# Patient Record
Sex: FEMALE | Race: WHITE
[De-identification: names, ages, dates, MRNs, and addresses within clinical notes are randomized per-mention and may not be internally consistent; named-entity substitution may affect disease eponyms.]

---

## 2017-07-20 ENCOUNTER — HOSPITAL ENCOUNTER (OUTPATIENT)
Dept: HOSPITAL 10 - GIL | Age: 57
Discharge: HOME | End: 2017-07-20
Attending: INTERNAL MEDICINE
Payer: COMMERCIAL

## 2017-07-20 VITALS — BODY MASS INDEX: 31.56 KG/M2 | HEIGHT: 62 IN | WEIGHT: 171.52 LBS

## 2017-07-20 VITALS — RESPIRATION RATE: 14 BRPM | HEART RATE: 70 BPM | DIASTOLIC BLOOD PRESSURE: 62 MMHG | SYSTOLIC BLOOD PRESSURE: 109 MMHG

## 2017-07-20 VITALS — RESPIRATION RATE: 18 BRPM | HEART RATE: 87 BPM | DIASTOLIC BLOOD PRESSURE: 71 MMHG | SYSTOLIC BLOOD PRESSURE: 135 MMHG

## 2017-07-20 DIAGNOSIS — I10: ICD-10-CM

## 2017-07-20 DIAGNOSIS — K92.1: Primary | ICD-10-CM

## 2017-07-20 DIAGNOSIS — K21.9: ICD-10-CM

## 2017-07-20 DIAGNOSIS — K29.60: ICD-10-CM

## 2017-07-20 DIAGNOSIS — K64.8: ICD-10-CM

## 2017-07-20 DIAGNOSIS — K44.9: ICD-10-CM

## 2017-07-20 DIAGNOSIS — E11.9: ICD-10-CM

## 2017-07-20 DIAGNOSIS — E78.5: ICD-10-CM

## 2017-07-20 PROCEDURE — 43239 EGD BIOPSY SINGLE/MULTIPLE: CPT

## 2017-07-20 PROCEDURE — 87081 CULTURE SCREEN ONLY: CPT

## 2017-07-20 PROCEDURE — 45378 DIAGNOSTIC COLONOSCOPY: CPT

## 2017-07-20 PROCEDURE — 82962 GLUCOSE BLOOD TEST: CPT

## 2019-01-25 ENCOUNTER — HOSPITAL ENCOUNTER (OUTPATIENT)
Dept: HOSPITAL 91 - SDS | Age: 59
Discharge: HOME | End: 2019-01-25
Payer: COMMERCIAL

## 2019-01-25 ENCOUNTER — HOSPITAL ENCOUNTER (OUTPATIENT)
Dept: HOSPITAL 10 - SDS | Age: 59
Discharge: HOME | End: 2019-01-25
Attending: SURGERY
Payer: COMMERCIAL

## 2019-01-25 VITALS
BODY MASS INDEX: 33.26 KG/M2 | WEIGHT: 176.15 LBS | HEIGHT: 61 IN | BODY MASS INDEX: 33.26 KG/M2 | HEIGHT: 61 IN | WEIGHT: 176.15 LBS

## 2019-01-25 VITALS — SYSTOLIC BLOOD PRESSURE: 111 MMHG | DIASTOLIC BLOOD PRESSURE: 67 MMHG | HEART RATE: 90 BPM | RESPIRATION RATE: 17 BRPM

## 2019-01-25 VITALS — RESPIRATION RATE: 16 BRPM | HEART RATE: 68 BPM | SYSTOLIC BLOOD PRESSURE: 120 MMHG | DIASTOLIC BLOOD PRESSURE: 68 MMHG

## 2019-01-25 VITALS — RESPIRATION RATE: 19 BRPM | HEART RATE: 90 BPM | SYSTOLIC BLOOD PRESSURE: 112 MMHG | DIASTOLIC BLOOD PRESSURE: 64 MMHG

## 2019-01-25 VITALS — HEART RATE: 100 BPM | SYSTOLIC BLOOD PRESSURE: 122 MMHG | RESPIRATION RATE: 14 BRPM | DIASTOLIC BLOOD PRESSURE: 65 MMHG

## 2019-01-25 VITALS — HEART RATE: 92 BPM | SYSTOLIC BLOOD PRESSURE: 119 MMHG | DIASTOLIC BLOOD PRESSURE: 69 MMHG | RESPIRATION RATE: 20 BRPM

## 2019-01-25 VITALS — RESPIRATION RATE: 20 BRPM | HEART RATE: 94 BPM | DIASTOLIC BLOOD PRESSURE: 69 MMHG | SYSTOLIC BLOOD PRESSURE: 120 MMHG

## 2019-01-25 VITALS — RESPIRATION RATE: 16 BRPM | SYSTOLIC BLOOD PRESSURE: 126 MMHG | DIASTOLIC BLOOD PRESSURE: 75 MMHG | HEART RATE: 95 BPM

## 2019-01-25 VITALS — HEART RATE: 112 BPM | SYSTOLIC BLOOD PRESSURE: 132 MMHG | DIASTOLIC BLOOD PRESSURE: 72 MMHG | RESPIRATION RATE: 20 BRPM

## 2019-01-25 VITALS — DIASTOLIC BLOOD PRESSURE: 71 MMHG | SYSTOLIC BLOOD PRESSURE: 116 MMHG | HEART RATE: 102 BPM | RESPIRATION RATE: 14 BRPM

## 2019-01-25 VITALS — DIASTOLIC BLOOD PRESSURE: 68 MMHG | SYSTOLIC BLOOD PRESSURE: 127 MMHG | RESPIRATION RATE: 16 BRPM | HEART RATE: 96 BPM

## 2019-01-25 VITALS — SYSTOLIC BLOOD PRESSURE: 110 MMHG | RESPIRATION RATE: 16 BRPM | DIASTOLIC BLOOD PRESSURE: 65 MMHG | HEART RATE: 65 BPM

## 2019-01-25 VITALS — DIASTOLIC BLOOD PRESSURE: 65 MMHG | HEART RATE: 92 BPM | RESPIRATION RATE: 20 BRPM | SYSTOLIC BLOOD PRESSURE: 110 MMHG

## 2019-01-25 VITALS — DIASTOLIC BLOOD PRESSURE: 74 MMHG | SYSTOLIC BLOOD PRESSURE: 114 MMHG | RESPIRATION RATE: 21 BRPM | HEART RATE: 83 BPM

## 2019-01-25 DIAGNOSIS — E11.9: ICD-10-CM

## 2019-01-25 DIAGNOSIS — I10: ICD-10-CM

## 2019-01-25 DIAGNOSIS — D17.21: Primary | ICD-10-CM

## 2019-01-25 PROCEDURE — 25071 EXC FOREARM LES SC 3 CM/>: CPT

## 2019-01-25 PROCEDURE — 82962 GLUCOSE BLOOD TEST: CPT

## 2019-01-25 PROCEDURE — 88304 TISSUE EXAM BY PATHOLOGIST: CPT

## 2019-01-25 PROCEDURE — 88305 TISSUE EXAM BY PATHOLOGIST: CPT

## 2019-01-25 RX ADMIN — LIDOCAINE HYDROCHLORIDE 1 ML: 10 INJECTION, SOLUTION EPIDURAL; INFILTRATION; INTRACAUDAL; PERINEURAL at 14:46

## 2019-01-25 RX ADMIN — CEFAZOLIN SODIUM 1 MLS/HR: 2 SOLUTION INTRAVENOUS at 14:08

## 2019-01-25 RX ADMIN — BUPIVACAINE HYDROCHLORIDE AND EPINEPHRINE BITARTRATE 1 ML: 5; .005 INJECTION, SOLUTION EPIDURAL; INTRACAUDAL; PERINEURAL at 14:45

## 2019-01-25 NOTE — OPR
Date/Time of Note


Date/Time of Note


DATE: 1/25/19 


TIME: 14:50





Operative Report


Procedure Date:  Jan 25, 2019


Preoperative Diagnosis


Right forearm lipoma


Postoperative Diagnosis


Right forearm lipoma


Operation/Procedure Performed


Excision of right forearm lipoma, 6 cm


Removal of excess skin 15 cm x 1 cm


Surgeon


see signature line


Assistant


None


Anesthesia Type:  general


Estimated Blood Loss:  0 - 10 ml's


Transfusion


   none


Specimen


Right forearm lipoma


Grafts/Implants


none


Complications


none


Pt Condition Post Procedure:  stable


Disposition:  PACU


Indications


This patient presented with a large lump along her right forearm that was 


causing pain and discomfort.  It was mobile and consistent with a very large 


lipoma for which she will undergo excision.  Given the excess skin in the area 


of the lipoma which has been stretched significantly she will also undergo 


removal of excess skin simultaneously to prevent formation of a seroma and 


infection.


Procedure Description


The right upper extremity was prepped and draped in Betadine solution.  Timeout 


was conducted.  20 mL of a mixture of quarter percent Marcaine and 1% lidocaine 


was infiltrated into the dermis.  The lipoma was very large and measured about 


10 cm from the outside.  In order to excise the lipoma and accompanying excess 


skin in elliptical incision measuring 15 cm longitudinally and 5 cm transversely


was created.  The skin along with subcutaneous tissue was cauterized down to the


capsule of the lipoma.  The lipoma was thereafter enucleated and removed with 


the capsule intact and on the table it was measured to be 6 cm in diameter.  


Afterwards the accompanying subcutaneous tissue and skin that was next the 


lipoma was removed so that the skin of the forearm could be reapproximated 


without any tension or disfigurement.  The subcutaneous tissue was 


reapproximated with interrupted 3-0 Vicryl sutures.  Irrigation was carried out 


with saline.  The skin edges were then reapproximated with interrupted 3-0 nylon


sutures.  Wet dry dressings were applied and then covered with Dermabond.  


Patient tolerated procedure well.  All instrument sponge and needle counts were 


correct at the end of the procedure.











ANABELLA KERR                   Jan 25, 2019 14:53

## 2019-01-25 NOTE — PREAC
Date/Time of Note


Date/Time of Note


DATE: 1/25/19 


TIME: 14:08





Anesthesia Eval and Record


Evaluation


Time Pre-Procedure Interview


DATE: 1/25/19 


TIME: 14:08


Age


58


Sex


female


NPO:  8 hrs


Preoperative diagnosis


Mass L[josé miguel Right Arm


Planned procedure


Excision Of Lipoma Right Arm  And Excision Of EXcess Skin





Past Medical History


Past Medical History:  Includes


Cardio:  HTN


Endo:  Diabetes


Pulm:  Asthma, Other


Neuro:  Peripheral neuropathy


Renal:  Other


Hepatic:  Other


GI:  Obesity


Heme:  Other


Psych:  Other


Infection(s):  Other


Recreational drugs:  Other


Pregnancy:  Other





Surgery & Anesthesia Issues


Aspiration risk





Meds


Anticoagulation:  No


Beta Blocker within 24 hr:  No


Reason Beta Blocker not given:  Pt. not on B-Blocker


Reported Medications


Liraglutide (Victoza 3-John) 0.6 Mg/0.1 Ml Pen.injctr, 0.6 MG PO DAILY


   1/25/19


Fluticasone Furoate (Arnuity Ellipta) 100 Mcg Blst.w.dev, 100 MCG INHALATION 


DAILY, #1 INHALER


   1/25/19


Alprazolam* (Alprazolam*) 0.25 Mg Tablet, 0.25 MG PO DAILY PRN for ANXIETY, TAB


   1/25/19


Propylene Glycol-Peg 400 (Systane 0.3-0.4% Eye Drops) 0.3-0.4 % - 30 Ml Drops, 1


 DROP BOTH EYES QID PRN for DRY EYES, #1 BOTTLE


   1/25/19


Albuterol Sulfate* (Ventolin HFA*) 18 Gm Hfa.aer.ad, 2 PUFF INHALATION Q6H PRN 


for WHEEZING AND SOB, #1 INHALER


   1/25/19


Captopril* (Captopril*) 25 Mg Tablet, 25 MG PO BID, #60 TAB


   1/25/19


Metformin* (Glucophage*) 1,000 Mg Tablet, 1000 MG PO BID, #60 TAB


   1/25/19


Ibuprofen* (Ibuprofen*) 800 Mg Tablet, 800 MG PO DAILY PRN for PAIN AND/OR 


INFLAMMATION, TAB


   1/25/19


Discontinued Reported Medications


Glucagon,Human Recombinant (Glucagon Emergency Kit) 1 Mg Kit, 1 MG IJ DAILY PRN 


for LOW GLUCOSE, KIT


   1/25/19


[aspirin]   No Conflict Check


   7/20/17


[ibuprofen]   No Conflict Check


   7/20/17


[atorvastatin]   No Conflict Check


   7/20/17


[captopril]   No Conflict Check


   7/20/17


[qvar inhaler]   No Conflict Check


   7/20/17


[Lantus]   No Conflict Check


   7/20/17


[humulin]   No Conflict Check


   7/20/17


[zantac]   No Conflict Check


   7/20/17





Current Medications


Cefazolin Sodium/ Dextrose 50 ml @  100 mls/hr ONCE IVPB ;  Start 1/25/19 at 


12:00;  Stop 1/25/19 at 16:00


Sodium Chloride 1,000 ml @  75 mls/hr [id number] IV ;  Start 1/25/19 at 12:00


Hydromorphone HCl (Dilaudid) 0.5 mg Q4H  PRN IV SEVERE PAIN LEVEL 7-10;  Start 


1/25/19 at 13:30


Meds reviewed:  Yes





Allergies


Coded Allergies:  


     acetaminophen (Verified  Allergy, Severe, 1/25/19)


Allergies Reviewed:  Yes





Labs/Studies


Labs Reviewed:  Reviewed by anesthesiologist


Pregnancy test:  Negative


Studies:  ECG, CXR





Pre-procedure Exam


Last vitals





Vital Signs


  Date      Temp  Pulse  Resp  B/P (MAP)   Pulse Ox  O2          O2 Flow    FiO2


Time                                                 Delivery    Rate


   1/25/19  97.5     95    16      126/75       100  Room Air


     12:37                           (92)





Airway:  Adequate mouth opening


Mallampati:  Mallampati II


Teeth:  Normal


Lung:  Abnormal


Heart:  Normal


Anticipated Difficutly with IV:  Anticipate Difficult IV Access





ASA Physical Status


ASA physical status:  2


Emergency:  None





Planned Anesthetic


General/MAC:  ETT


Neuraxial:  Other


Nerve block:  Other





Planned Pain Management


Parenteral pain med, Local by surgeon





Pre-operative Attestations


Prior to commencing anesthesia and surgery, the patient was re-evaluated, there 


was verification of:


*The patient's identity


*The results of appropriate recent lab work and preoperative vital signs


*The above evaluation not changing prior to induction


*Anesthetic plan, risk benefits, alternative and complications discussed with 


patient/family; questions answered; patient/family understands, accepts and 


wishes to proceed.











ADE AMBROSE MD               Jan 25, 2019 14:12

## 2019-01-25 NOTE — PAC
Date/Time of Note


Date/Time of Note


DATE: 1/25/19 


TIME: 15:10





Post-Anesthesia Notes


Post-Anesthesia Note


Last documented vital signs





Vital Signs


  Date      Temp  Pulse  Resp  B/P (MAP)   Pulse Ox  O2          O2 Flow    FiO2


Time                                                 Delivery    Rate


   1/25/19  97.5     95    16      126/75       100  Room Air


     12:37                           (92)





Activity:  WNL


Respiratory function:  WNL


Cardiovascular function:  WNL


Mental status:  Baseline


Pain reasonably controlled:  Yes


Hydration appropriate:  Yes


Nausea/Vomiting absent:  No











ADE AMBROSE MD               Jan 25, 2019 15:11
